# Patient Record
Sex: FEMALE | Race: BLACK OR AFRICAN AMERICAN | Employment: FULL TIME | URBAN - METROPOLITAN AREA
[De-identification: names, ages, dates, MRNs, and addresses within clinical notes are randomized per-mention and may not be internally consistent; named-entity substitution may affect disease eponyms.]

---

## 2017-01-03 DIAGNOSIS — Z76.0 MEDICATION REFILL: ICD-10-CM

## 2017-01-03 RX ORDER — ISOSORBIDE MONONITRATE 60 MG/1
60 TABLET, EXTENDED RELEASE ORAL
Qty: 30 TAB | Refills: 5 | Status: SHIPPED | OUTPATIENT
Start: 2017-01-03

## 2017-01-03 NOTE — TELEPHONE ENCOUNTER
Requested Prescriptions     Pending Prescriptions Disp Refills    isosorbide mononitrate ER (IMDUR) 60 mg CR tablet 30 Tab 5     Sig: Take 1 Tab by mouth every morning.  Indications: ANGINA PECTORIS PREVENTION (DO NOT USE)

## 2017-01-05 DIAGNOSIS — Z76.0 MEDICATION REFILL: ICD-10-CM

## 2017-01-05 RX ORDER — METFORMIN HYDROCHLORIDE 500 MG/1
TABLET ORAL
Qty: 60 TAB | Refills: 5 | Status: SHIPPED | OUTPATIENT
Start: 2017-01-05 | End: 2017-02-15 | Stop reason: SDUPTHER

## 2017-02-14 DIAGNOSIS — Z76.0 MEDICATION REFILL: ICD-10-CM

## 2017-02-14 RX ORDER — AMLODIPINE BESYLATE 10 MG/1
10 TABLET ORAL DAILY
Qty: 90 TAB | Refills: 5 | Status: SHIPPED | OUTPATIENT
Start: 2017-02-14 | End: 2017-03-20 | Stop reason: SDUPTHER

## 2017-02-14 RX ORDER — ROSUVASTATIN CALCIUM 20 MG/1
20 TABLET, COATED ORAL
Qty: 90 TAB | Refills: 5 | Status: SHIPPED | OUTPATIENT
Start: 2017-02-14

## 2017-02-15 ENCOUNTER — TELEPHONE (OUTPATIENT)
Dept: INTERNAL MEDICINE CLINIC | Age: 82
End: 2017-02-15

## 2017-02-15 DIAGNOSIS — Z76.0 MEDICATION REFILL: ICD-10-CM

## 2017-02-15 RX ORDER — GLIPIZIDE 5 MG/1
5 TABLET ORAL 2 TIMES DAILY
Qty: 60 TAB | Refills: 5 | Status: SHIPPED | OUTPATIENT
Start: 2017-02-15 | End: 2017-05-04 | Stop reason: SDUPTHER

## 2017-02-15 RX ORDER — METFORMIN HYDROCHLORIDE 500 MG/1
TABLET ORAL
Qty: 90 TAB | Refills: 5 | Status: SHIPPED | OUTPATIENT
Start: 2017-02-15

## 2017-02-15 NOTE — TELEPHONE ENCOUNTER
Spoke with Ministerio Anders NP, confirmed pt name and . Informed that NP was making annual house visit, patient's  and is asymptomatic. Informed that patient is currently only taking Metformin 500 BID. I advised that the patient was supposed to be taking Glucotrol twice a day, the NP and the pt denied. Stated that the hospital had the patient DC Glucotrol 2016. I advised that Dr. Yovanny Cisneros had restarted at the end of last year. NP stated that the patient has not been taking Glucotrol. After speaking with Dr. Yovanny Cisneros, I advised the NP that Dr. Yovanny Cisneros would be sending over a new Rx for Glucotrol as well as metformin to the patient's local pharmacy. It needed to be picked up and started tonight. The patient would be taking Glucotrol 10 mg BID and Metformin 500 mg 2 tab in the AM and 1 in the afternoon. Ciara and the patient verbalized understanding.  I also spoke with the patient's daughter and scheduled an appointment for the patient for tomorrow @ 130 per Dr. Yovanny Cisneros.     Be advised

## 2017-02-15 NOTE — TELEPHONE ENCOUNTER
I can not locate the other note written earlier by me. It indicated changes in meds due to the high blood sugar. Apparently in the hospital they stopped her glyburide last fall but I restarted in December. However, she has none at home. As she has no sxs per [address], will inc AM metformin to 1000 mg and keep afternoon at 500 mg. Add back glipizide but at lower dose of 5 mg BID.  F/U to be scheduled here

## 2017-02-16 ENCOUNTER — HOSPITAL ENCOUNTER (OUTPATIENT)
Dept: LAB | Age: 82
Discharge: HOME | End: 2017-02-16
Payer: MEDICARE

## 2017-02-16 ENCOUNTER — OFFICE VISIT (OUTPATIENT)
Dept: INTERNAL MEDICINE CLINIC | Age: 82
End: 2017-02-16

## 2017-02-16 VITALS
WEIGHT: 140.2 LBS | OXYGEN SATURATION: 97 % | RESPIRATION RATE: 16 BRPM | BODY MASS INDEX: 22 KG/M2 | TEMPERATURE: 97.8 F | HEART RATE: 73 BPM | DIASTOLIC BLOOD PRESSURE: 63 MMHG | SYSTOLIC BLOOD PRESSURE: 145 MMHG | HEIGHT: 67 IN

## 2017-02-16 DIAGNOSIS — E11.9 CONTROLLED TYPE 2 DIABETES MELLITUS WITHOUT COMPLICATION, WITHOUT LONG-TERM CURRENT USE OF INSULIN (HCC): Primary | Chronic | ICD-10-CM

## 2017-02-16 DIAGNOSIS — Z79.899 MEDICATION MANAGEMENT: ICD-10-CM

## 2017-02-16 DIAGNOSIS — E11.9 CONTROLLED TYPE 2 DIABETES MELLITUS WITHOUT COMPLICATION, WITHOUT LONG-TERM CURRENT USE OF INSULIN (HCC): Chronic | ICD-10-CM

## 2017-02-16 LAB
ALBUMIN SERPL BCP-MCNC: 4 G/DL (ref 3.4–5)
ALBUMIN/GLOB SERPL: 1.1 {RATIO} (ref 0.8–1.7)
ALP SERPL-CCNC: 111 U/L (ref 45–117)
ALT SERPL-CCNC: 25 U/L (ref 13–56)
ANION GAP BLD CALC-SCNC: 15 MMOL/L (ref 3–18)
AST SERPL W P-5'-P-CCNC: 18 U/L (ref 15–37)
BILIRUB SERPL-MCNC: 0.3 MG/DL (ref 0.2–1)
BUN SERPL-MCNC: 17 MG/DL (ref 7–18)
BUN/CREAT SERPL: 14 (ref 12–20)
CALCIUM SERPL-MCNC: 9.4 MG/DL (ref 8.5–10.1)
CHLORIDE SERPL-SCNC: 99 MMOL/L (ref 100–108)
CHOLEST SERPL-MCNC: 221 MG/DL
CO2 SERPL-SCNC: 26 MMOL/L (ref 21–32)
CREAT SERPL-MCNC: 1.25 MG/DL (ref 0.6–1.3)
EST. AVERAGE GLUCOSE BLD GHB EST-MCNC: 344 MG/DL
GLOBULIN SER CALC-MCNC: 3.5 G/DL (ref 2–4)
GLUCOSE SERPL-MCNC: 348 MG/DL (ref 74–99)
HBA1C MFR BLD: 13.6 % (ref 4.2–5.6)
HDLC SERPL-MCNC: 68 MG/DL (ref 40–60)
HDLC SERPL: 3.3 {RATIO} (ref 0–5)
LDLC SERPL CALC-MCNC: 114 MG/DL (ref 0–100)
LIPID PROFILE,FLP: ABNORMAL
POTASSIUM SERPL-SCNC: 4.4 MMOL/L (ref 3.5–5.5)
PROT SERPL-MCNC: 7.5 G/DL (ref 6.4–8.2)
SODIUM SERPL-SCNC: 140 MMOL/L (ref 136–145)
TRIGL SERPL-MCNC: 195 MG/DL (ref ?–150)
VLDLC SERPL CALC-MCNC: 39 MG/DL

## 2017-02-16 PROCEDURE — 80053 COMPREHEN METABOLIC PANEL: CPT | Performed by: INTERNAL MEDICINE

## 2017-02-16 PROCEDURE — 83036 HEMOGLOBIN GLYCOSYLATED A1C: CPT | Performed by: INTERNAL MEDICINE

## 2017-02-16 PROCEDURE — 82043 UR ALBUMIN QUANTITATIVE: CPT | Performed by: INTERNAL MEDICINE

## 2017-02-16 PROCEDURE — 36415 COLL VENOUS BLD VENIPUNCTURE: CPT | Performed by: INTERNAL MEDICINE

## 2017-02-16 PROCEDURE — 80061 LIPID PANEL: CPT | Performed by: INTERNAL MEDICINE

## 2017-02-16 NOTE — MR AVS SNAPSHOT
Visit Information Date & Time Provider Department Dept. Phone Encounter #  
 2/16/2017  1:30 PM Александр Lacy, [address] [id number] Follow-up Instructions Return in about 3 weeks (around 3/9/2017) for diabetes mellitus, htn. Your Appointments 3/24/2017 11:00 AM  
Follow Up with MD Isabel Ken [phone] [facility]) Appt Note: Pt's daughter requested apt due to Alzhiemer's getting worse per daughter. [address] [place] [id number] [phone]  
  
   
 [facility] [id number] Upcoming Health Maintenance Date Due ZOSTER VACCINE AGE 60> 10/14/1991 FOOT EXAM Q1 11/7/2014 HEMOGLOBIN A1C Q6M 6/30/2016 INFLUENZA AGE 9 TO ADULT 8/1/2016 Pneumococcal 65+ Low/Medium Risk (2 of 2 - PPSV23) 8/31/2016 MEDICARE YEARLY EXAM 8/31/2016 LIPID PANEL Q1 8/31/2016 MICROALBUMIN Q1 12/30/2016 DTaP/Tdap/Td series (1 - Tdap) 6/16/2017* EYE EXAM RETINAL OR DILATED Q1 5/4/2017 GLAUCOMA SCREENING Q2Y 5/4/2018 *Topic was postponed. The date shown is not the original due date. Allergies as of 2/16/2017  Review Complete On: 2/16/2017 By: Александр Lacy MD  
  
 Severity Noted Reaction Type Reactions Augmentin [Amoxicillin-pot Clavulanate]  04/25/2013   Side Effect Swelling Lisinopril  04/25/2013   Systemic Angioedema Reglan [Metoclopramide]  11/28/2015    Other (comments) Made her stomach hurt worse Current Immunizations  Reviewed on 11/7/2013 Name Date Influenza High Dose Vaccine PF 12/30/2015  1:09 PM  
 Influenza Vaccine 12/3/2014 Influenza Vaccine PF 11/7/2013 11:31 AM  
 Pneumococcal Conjugate (PCV-13) 8/31/2015 Not reviewed this visit You Were Diagnosed With   
  
 Codes Comments  Controlled type 2 diabetes mellitus without complication, without long-term current use of insulin ([facility]    -  Primary ICD-10-CM: E11.9 ICD-9-CM: 250.00 Medication management     ICD-10-CM: Z79.899 ICD-9-CM: V58.69 Vitals BP Pulse Temp Resp Height(growth percentile) Weight(growth percentile) 145/63 73 97.8 °F (36.6 °C) (Oral) 16 5' 7\" (1.702 m) 140 lb 3.2 oz (63.6 kg) SpO2 BMI OB Status Smoking Status 97% 21.96 kg/m2 Postmenopausal Never Smoker BMI and BSA Data Body Mass Index Body Surface Area  
 21.96 kg/m 2 1.73 m 2 Preferred Pharmacy Pharmacy Name Phone [facility] [address] [phone] Your Updated Medication List  
  
   
This list is accurate as of: 2/16/17  1:50 PM.  Always use your most recent med list. amLODIPine 10 mg tablet Commonly known as:  Kaia Croon Take 1 Tab by mouth daily. Indications: hypertension  
  
 aspirin 81 mg chewable tablet Take 162 mg by mouth daily. clopidogrel 75 mg Tab Commonly known as:  PLAVIX Take 75 mg by mouth daily. ergocalciferol 50,000 unit capsule Commonly known as:  VITAMIN D2 Take 1 Cap by mouth every seven (7) days. glipiZIDE 5 mg tablet Commonly known as:  Annika Cee Take 1 Tab by mouth two (2) times a day. Indications: type 2 diabetes mellitus  
  
 glucose blood VI test strips strip Commonly known as:  ACCU-CHEK RADHA PLUS TEST STRP Use to test blood sugar daily  
  
 isosorbide mononitrate ER 60 mg CR tablet Commonly known as:  IMDUR Take 1 Tab by mouth every morning. Indications: ANGINA PECTORIS PREVENTION (DO NOT USE) Lancets Misc Commonly known as:  ACCU-CHEK SOFTCLIX LANCETS Use to check blood sugar at least once daily  
  
 metFORMIN 500 mg tablet Commonly known as:  GLUCOPHAGE Take 2 tablets in the morning with breakfast and one in the evening with supper  Indications: type 2 diabetes mellitus  
  
 omeprazole 40 mg capsule Commonly known as:  PRILOSEC Take 1 Cap by mouth two (2) times a day. Indications: GASTRIC ULCER  
  
 propylthiouracil 50 mg tablet Commonly known as:  PTU Take 1 Tab by mouth three (3) times daily. rivastigmine 4.6 mg/24 hr patch Commonly known as:  EXELON  
1 Patch by TransDERmal route daily. rosuvastatin 20 mg tablet Commonly known as:  CRESTOR Take 1 Tab by mouth nightly. Indications: hypercholesterolemia  
  
 valsartan-hydroCHLOROthiazide 320-25 mg per tablet Commonly known as:  DIOVAN HCT Take 1 Tab by mouth daily. Follow-up Instructions Return in about 3 weeks (around 3/9/2017) for diabetes mellitus, htn. To-Do List   
 02/16/2017 Lab:  HEMOGLOBIN A1C WITH EAG   
  
 02/16/2017 Lab:  LIPID PANEL   
  
 02/16/2017 Lab:  METABOLIC PANEL, COMPREHENSIVE   
  
 02/16/2017 Lab:  MICROALBUMIN, UR, RAND W/ MICROALBUMIN/CREA RATIO Introducing [facility]! Kiesha Bishop introduces [employer] patient portal. Now you can access parts of your medical record, email your doctor's office, and request medication refills online. 1. In your internet browser, go to [email or web address]. [employer]/Next 2 [place]st 2. Click on the First Time User? Click Here link in the Sign In box. You will see the New Member Sign Up page. 3. Enter your [employer] Access Code exactly as it appears below. You will not need to use this code after youve completed the sign-up process. If you do not sign up before the expiration date, you must request a new code. · [employer] Access Code: [email or web address] Expires: 5/17/2017  1:50 PM 
 
4. Enter the last four digits of your Social Security Number (xxxx) and Date of Birth (mm/dd/yyyy) as indicated and click Submit. You will be taken to the next sign-up page. 5. Create a [employer] ID. This will be your [employer] login ID and cannot be changed, so think of one that is secure and easy to remember. 6. Create a [employer] password. You can change your password at any time. 7. Enter your Password Reset Question and Answer. This can be used at a later time if you forget your password. 8. Enter your e-mail address. You will receive e-mail notification when new information is available in [address] 9. Click Sign Up. You can now view and download portions of your medical record. 10. Click the Download Summary menu link to download a portable copy of your medical information. If you have questions, please visit the Frequently Asked Questions section of the [employer] website. Remember, [employer] is NOT to be used for urgent needs. For medical emergencies, dial 911. Now available from your iPhone and Android! Please provide this summary of care documentation to your next provider. Your primary care clinician is listed as [facility]. If you have any questions after today's visit, please call [phone].

## 2017-02-16 NOTE — PROGRESS NOTES
HISTORY OF PRESENT ILLNESS  Celeste Enamorado is a 80 y.o. female. Visit Vitals    /63    Pulse 73    Temp 97.8 °F (36.6 °C) (Oral)    Resp 16    Ht 5' 7\" (1.702 m)    Wt 140 lb 3.2 oz (63.6 kg)    SpO2 97%    BMI 21.96 kg/m2       HPI Comments: Yesterday had high blood sugar. She denies polyuria, polydipsia, vision changes. But has had some mild nausea. Has dry mouth. This AM felt a little more fatigued    Since she stopped working she has not been watching her diet. She craves sweets now. Weight up 7#    We restarted her glipizide back but at a lower dose    High Blood Sugar   The history is provided by the patient (see comments). This is a chronic problem. The current episode started more than 1 week ago. The problem occurs daily. Associated symptoms include shortness of breath (no significant change). Review of Systems   Constitutional: Negative. Respiratory: Positive for shortness of breath (no significant change). Cardiovascular: Negative. Genitourinary: Negative for frequency and urgency. Endo/Heme/Allergies: Negative for polydipsia. Physical Exam   Constitutional: She is oriented to person, place, and time. She appears well-developed and well-nourished. No distress. Neck: No JVD present. Cardiovascular: Normal rate and regular rhythm. Pulmonary/Chest: Effort normal and breath sounds normal.   Neurological: She is alert and oriented to person, place, and time. Skin: Skin is warm and dry. She is not diaphoretic. Nursing note and vitals reviewed. ASSESSMENT and PLAN    ICD-10-CM ICD-9-CM    1.  Controlled type 2 diabetes mellitus without complication, without long-term current use of insulin ([facility]) S96.9 384.20 METABOLIC PANEL, COMPREHENSIVE      HEMOGLOBIN A1C WITH EAG      MICROALBUMIN, UR, RAND W/ MICROALBUMIN/CREA RATIO      LIPID PANEL   2. Medication management Z79.899 V58.69      15 minute visit, > 50% of time discussing blood sugar control inc diet, exercise, monitoing more closely  Family will  additional meds today    Update lab today.  Will do home checks daily    Cautioned re sweets--need to cut down on sugars    F/u 2-3  Weeks for recheck

## 2017-02-16 NOTE — PROGRESS NOTES
ROOM # 3    Pt presents today for Dm issues, BS was 569 yesterday evening    Pt preferred language for health care discussion is english. Is someone accompanying this pt? daughter    Is the patient using any DME equipment during OV? no    Depression Screening completed. Yes    Learning Assessment completed. Yes    Abuse Screening completed. Yes    Health Maintenance reviewed and discussed per provider. Yes, all HM discussed with the provider    Advance Directive:  1. Do you have an advance directive in place? Patient Reply: No    2. If not, would you like material regarding how to put one in place? Patient Reply: No    Coordination of Care:  1. Have you been to the ER, urgent care clinic since your last visit? Hospitalized since your last visit? No    2. Have you seen or consulted any other health care providers outside of the [address] [place] [address] since your last visit? Include any pap smears or colon screening.  No

## 2017-02-17 LAB
CREAT UR-MCNC: 174.98 MG/DL (ref 30–125)
MICROALBUMIN UR-MCNC: 7.3 MG/DL (ref 0–3)
MICROALBUMIN/CREAT UR-RTO: 42 MG/G (ref 0–30)

## 2017-02-18 ENCOUNTER — TELEPHONE (OUTPATIENT)
Dept: INTERNAL MEDICINE CLINIC | Age: 82
End: 2017-02-18

## 2017-02-18 NOTE — PROGRESS NOTES
Please advise her that her BS was very high, but the change in medication should help.  We'll f/u as planned

## 2017-02-18 NOTE — TELEPHONE ENCOUNTER
----- Message from Rachel Sahu MD sent at 2/18/2017  9:35 AM EST -----  Please advise her that her BS was very high, but the change in medication should help.  We'll f/u as planned

## 2017-02-18 NOTE — TELEPHONE ENCOUNTER
Spoke with patient, confirmed name and . Advised patient of lab results and f/u plan, per Dr. Alona Mcrae. Patient verbalized understanding.      Be advised

## 2017-03-09 ENCOUNTER — OFFICE VISIT (OUTPATIENT)
Dept: INTERNAL MEDICINE CLINIC | Age: 82
End: 2017-03-09

## 2017-03-09 VITALS
SYSTOLIC BLOOD PRESSURE: 134 MMHG | OXYGEN SATURATION: 96 % | HEIGHT: 67 IN | DIASTOLIC BLOOD PRESSURE: 64 MMHG | TEMPERATURE: 96.8 F | HEART RATE: 75 BPM | RESPIRATION RATE: 16 BRPM | BODY MASS INDEX: 21.97 KG/M2 | WEIGHT: 140 LBS

## 2017-03-09 DIAGNOSIS — Z13.39 SCREENING FOR ALCOHOLISM: ICD-10-CM

## 2017-03-09 DIAGNOSIS — I10 ESSENTIAL HYPERTENSION: Chronic | ICD-10-CM

## 2017-03-09 DIAGNOSIS — Z23 ENCOUNTER FOR IMMUNIZATION: ICD-10-CM

## 2017-03-09 DIAGNOSIS — Z00.00 ROUTINE GENERAL MEDICAL EXAMINATION AT A HEALTH CARE FACILITY: Primary | ICD-10-CM

## 2017-03-09 DIAGNOSIS — E11.9 CONTROLLED TYPE 2 DIABETES MELLITUS WITHOUT COMPLICATION, WITHOUT LONG-TERM CURRENT USE OF INSULIN (HCC): Chronic | ICD-10-CM

## 2017-03-09 LAB — GLUCOSE POC: 253 MG/DL

## 2017-03-09 NOTE — PROGRESS NOTES
HISTORY OF PRESENT ILLNESS  Mellisa Sheikh is a 80 y.o. female. Visit Vitals    /64    Pulse 75    Temp 96.8 °F (36 °C) (Oral)    Resp 16    Ht 5' 7\" (1.702 m)    Wt 140 lb (63.5 kg)    SpO2 96%    BMI 21.93 kg/m2       Diabetes   The history is provided by the patient. This is a chronic problem. The current episode started more than 1 week ago. The problem occurs daily. The problem has not changed since onset. Exacerbated by: diet. The symptoms are relieved by medications (diet). Treatments tried: see med list. The treatment provided moderate relief. Hypertension    The history is provided by the patient. This is a chronic problem. The current episode started more than 1 week ago. The problem has not changed since onset. There are no associated agents to hypertension. Risk factors include postmenopause, stress and hypertension. Review of Systems   Constitutional: Negative. Respiratory: Negative. Cardiovascular: Negative. Physical Exam   Constitutional: She is oriented to person, place, and time. She appears well-developed and well-nourished. No distress. Cardiovascular: Normal rate and regular rhythm. Pulmonary/Chest: Effort normal and breath sounds normal.   Musculoskeletal: She exhibits no edema. Neurological: She is alert and oriented to person, place, and time. Skin: Skin is warm and dry. She is not diaphoretic. Psychiatric: She has a normal mood and affect. Nursing note and vitals reviewed. ASSESSMENT and PLAN    ICD-10-CM ICD-9-CM                                Controlled type 2 diabetes mellitus without complication, without long-term current use of insulin ([facility]) E11.9 250.00 AMB POC GLUCOSE BLOOD, BY GLUCOSE MONITORING DEVICE    Essential hypertension I10 401.9        BP controlled    --much better at home, but is much better than last time. Will continue current meds for now    3/3 objects remembered.     F/u 6-8 weeks with additional lab

## 2017-03-09 NOTE — PROGRESS NOTES
Chief Complaint   Patient presents with    Annual Wellness Visit    Diabetes    Hypertension       Pt preferred language for health care discussion is english. Is someone accompanying this pt? daughter    Is the patient using any DME equipment during OV? no    Depression Screening completed. Yes    Learning Assessment completed. Yes    Abuse Screening completed. Yes    Health Maintenance reviewed and discussed per provider. Yes   Foot exam, Zostavax, Pneumo-13 or Peumo-23, Flu. Please order/place referral if appropriate. Advance Directive:  1. Do you have an advance directive in place? Patient Reply:no    2. If not, would you like material regarding how to put one in place? Patient Reply: No    Coordination of Care:  1. Have you been to the ER, urgent care clinic since your last visit? Hospitalized since your last visit? no    2. Have you seen or consulted any other health care providers outside of the [address] [place] [address] since your last visit? Include any pap smears or colon screening.  no

## 2017-03-09 NOTE — PROGRESS NOTES
This is a Subsequent Medicare Annual Wellness Visit providing Personalized Prevention Plan Services (PPPS) (Performed 12 months after initial AWV and PPPS )    I have reviewed the patient's medical history in detail and updated the computerized patient record. History     Past Medical History:   Diagnosis Date    CAD (coronary artery disease)     CH[facility]congestive heart failure) ([facility])     Diabetes ([facility]     Dyslipidemia     Gastric erosions 5/31/16    endoclips used    Hypertension     Hyperthyroidism     Memory change     MI (myocardial infarction) ([facility]       Past Surgical History:   Procedure Laterality Date    CARDIAC SURG PROCEDURE UNLIST  6 stents placed    HX CHOLECYSTECTOMY      stones    HX CORONARY STENT PLACEMENT  2003     Current Outpatient Prescriptions   Medication Sig Dispense Refill    metFORMIN (GLUCOPHAGE) 500 mg tablet Take 2 tablets in the morning with breakfast and one in the evening with supper  Indications: type 2 diabetes mellitus 90 Tab 5    glipiZIDE (GLUCOTROL) 5 mg tablet Take 1 Tab by mouth two (2) times a day. Indications: type 2 diabetes mellitus 60 Tab 5    rosuvastatin (CRESTOR) 20 mg tablet Take 1 Tab by mouth nightly. Indications: hypercholesterolemia 90 Tab 5    amLODIPine (NORVASC) 10 mg tablet Take 1 Tab by mouth daily. Indications: hypertension 90 Tab 5    isosorbide mononitrate ER (IMDUR) 60 mg CR tablet Take 1 Tab by mouth every morning. Indications: ANGINA PECTORIS PREVENTION (DO NOT USE) 30 Tab 5    ergocalciferol (VITAMIN D2) 50,000 unit capsule Take 1 Cap by mouth every seven (7) days. 90 Cap 2    rivastigmine (EXELON) 4.6 mg/24 hr patch 1 Patch by TransDERmal route daily. 30 Patch 1    propylthiouracil (PTU) 50 mg tablet Take 1 Tab by mouth three (3) times daily. 90 Tab 5    valsartan-hydrochlorothiazide (DIOVAN HCT) 320-25 mg per tablet Take 1 Tab by mouth daily.  90 Tab 5    omeprazole (PRILOSEC) 40 mg capsule Take 1 Cap by mouth two (2) times a day. Indications: GASTRIC ULCER 60 Cap 5    Lancets (ACCU-CHEK SOFTCLIX LANCETS) misc Use to check blood sugar at least once daily 1 Package 11    glucose blood VI test strips (ACCU-CHEK RADHA PLUS TEST STRP) strip Use to test blood sugar daily 90 Strip 5    aspirin 81 mg chewable tablet Take 162 mg by mouth daily.  clopidogrel (PLAVIX) 75 mg tablet Take 75 mg by mouth daily. Allergies   Allergen Reactions    Augmentin [Amoxicillin-Pot Clavulanate] Swelling    Lisinopril Angioedema    Reglan [Metoclopramide] Other (comments)     Made her stomach hurt worse     Family History   Problem Relation Age of Onset    Rashes/Skin Problems Sister     Heart Disease Brother     Glaucoma Sister     Other Sister      internal bleeding    Kidney Disease Brother     Hypertension Brother      Social History   Substance Use Topics    Smoking status: Never Smoker    Smokeless tobacco: Never Used    Alcohol use No     Patient Active Problem List   Diagnosis Code    Diabetes mellitus type 2, controlled ([facility]) E11.9    ASCVD (arteriosclerotic cardiovascular disease) I25.10    Essential hypertension I10    Hyperthyroidism E05.90    Advance directive discussed with patient Z70.80    Memory loss R41.3       Depression Risk Factor Screening:     PHQ 2 / 9, over the last two weeks 3/9/2017   Little interest or pleasure in doing things Not at all   Feeling down, depressed or hopeless Not at all   Total Score PHQ 2 0     Alcohol Risk Factor Screening: On any occasion during the past 3 months, have you had more than 3 drinks containing alcohol? No    Do you average more than 7 drinks per week? No      Functional Ability and Level of Safety:     Hearing Loss   none    Activities of Daily Living   Self-care. Requires assistance with: no ADLs    Fall Risk     Fall Risk Assessment, last 12 mths 3/9/2017   Able to walk? Yes   Fall in past 12 months?  No     Abuse Screen   Patient is not abused    Review of Systems   A comprehensive review of systems was negative except for that written in the HPI. Physical Examination     Evaluation of Cognitive Function:  Mood/affect:  happy  Appearance: age appropriate  Family member/caregiver input: self and friend    Visit Vitals    /64    Pulse 75    Temp 96.8 °F (36 °C) (Oral)    Resp 16    Ht 5' 7\" (1.702 m)    Wt 140 lb (63.5 kg)    SpO2 96%    BMI 21.93 kg/m2     General appearance: alert, cooperative, no distress, appears stated age  Ears: normal TM's and external ear canals AU  Throat: Lips, mucosa, and tongue normal. Teeth and gums normal  Lungs: clear to auscultation bilaterally  Heart: regular rate and rhythm, S1, S2 normal, no murmur, click, rub or gallop  Extremities: extremities normal, atraumatic, no cyanosis or edema  0/3 objects remembered  2nd set she remembered 3/3. Patient Care Team:  Kiana Zuleta MD as PCP - General (Internal Medicine)  Raquel Aguilar MD as Referring Provider (Internal Medicine)  Oh Reyes MD as Consulting Provider (Neurology)    Advice/Referrals/Counseling   Education and counseling provided:  Are appropriate based on today's review and evaluation      Assessment/Plan       ICD-10-CM ICD-9-CM    1. Routine general medical examination at a health care facility Z00.00 V70.0    2. Screening for alcoholism Z13.89 V79.1    3.  Encounter for immunization Z23 V03.89 PNEUMOCOCCAL POLYSACCHARIDE VACCINE, 23-VALENT, ADULT OR IMMUNOSUPPRESSED PT DOSE,      ADMIN PNEUMOCOCCAL VACCINE

## 2017-03-09 NOTE — MR AVS SNAPSHOT
Visit Information Date & Time Provider Department Dept. Phone Encounter #  
 3/9/2017  1:45 PM Carolynn Leonardoodessa Vobi [phone] [id number] Follow-up Instructions Return in about 8 weeks (around 5/4/2017) for HTN, DM, cholesterol. Your Appointments 3/24/2017 11:00 AM  
Follow Up with Hermelinda Nguyen MD  
[address] [place] [address]-[facility] Appt Note: Pt's daughter requested apt due to Alzhiemer's getting worse per daughter. [address] [facility] [id number] [phone]  
  
   
 [facility] [address] Upcoming Health Maintenance Date Due  
 FOOT EXAM Q1 11/7/2014 Pneumococcal 65+ Low/Medium Risk (2 of 2 - PPSV23) 8/31/2016 MEDICARE YEARLY EXAM 8/31/2016 DTaP/Tdap/Td series (1 - Tdap) 6/16/2017* ZOSTER VACCINE AGE 60> 3/9/2018* EYE EXAM RETINAL OR DILATED Q1 5/4/2017 HEMOGLOBIN A1C Q6M 8/16/2017 MICROALBUMIN Q1 2/16/2018 LIPID PANEL Q1 2/16/2018 GLAUCOMA SCREENING Q2Y 5/4/2018 *Topic was postponed. The date shown is not the original due date. Allergies as of 3/9/2017  Review Complete On: 3/9/2017 By: Carolynn Mendez MD  
  
 Severity Noted Reaction Type Reactions Augmentin [Amoxicillin-pot Clavulanate]  04/25/2013   Side Effect Swelling Lisinopril  04/25/2013   Systemic Angioedema Reglan [Metoclopramide]  11/28/2015    Other (comments) Made her stomach hurt worse Current Immunizations  Reviewed on 11/7/2013 Name Date Influenza High Dose Vaccine PF 12/30/2015  1:09 PM  
 Influenza Vaccine 12/3/2014 Influenza Vaccine PF 11/7/2013 11:31 AM  
 Pneumococcal Conjugate (PCV-13) 8/31/2015 Pneumococcal Polysaccharide (PPSV-23)  Incomplete Not reviewed this visit You Were Diagnosed With   
  
 Codes Comments  Routine general medical examination at a health care facility    - Primary ICD-10-CM: Z00.00 ICD-9-CM: V70.0 Screening for alcoholism     ICD-10-CM: Z13.89 ICD-9-CM: V79.1 Controlled type 2 diabetes mellitus without complication, without long-term current use of insulin ([facility]     ICD-10-CM: E11.9 ICD-9-CM: 250.00 Essential hypertension     ICD-10-CM: I10 
ICD-9-CM: 401.9 Encounter for immunization     ICD-10-CM: P53 ICD-9-CM: V03.89 Vitals BP Pulse Temp Resp Height(growth percentile) Weight(growth percentile) 134/64 75 96.8 °F (36 °C) (Oral) 16 5' 7\" (1.702 m) 140 lb (63.5 kg) SpO2 BMI OB Status Smoking Status 96% 21.93 kg/m2 Postmenopausal Never Smoker Vitals History BMI and BSA Data Body Mass Index Body Surface Area  
 21.93 kg/m 2 1.73 m 2 Preferred Pharmacy Pharmacy [facility] Phone [facility] [address] [phone] Your Updated Medication List  
  
   
This list is accurate as of: 3/9/17  2:34 PM.  Always use your most recent med list. amLODIPine 10 mg tablet Commonly known as:  Ara Pace Take 1 Tab by mouth daily. Indications: hypertension  
  
 aspirin 81 mg chewable tablet Take 162 mg by mouth daily. clopidogrel 75 mg Tab Commonly known as:  PLAVIX Take 75 mg by mouth daily. ergocalciferol 50,000 unit capsule Commonly known as:  VITAMIN D2 Take 1 Cap by mouth every seven (7) days. glipiZIDE 5 mg tablet Commonly known as:  Hollie Powell Take 1 Tab by mouth two (2) times a day. Indications: type 2 diabetes mellitus  
  
 glucose blood VI test strips strip Commonly known as:  ACCU-CHEK RADHA PLUS TEST STRP Use to test blood sugar daily  
  
 isosorbide mononitrate ER 60 mg CR tablet Commonly known as:  IMDUR Take 1 Tab by mouth every morning. Indications: ANGINA PECTORIS PREVENTION (DO NOT USE) Lancets Misc Commonly known as:  ACCU-CHEK SOFTCLIX LANCETS  
 Use to check blood sugar at least once daily  
  
 metFORMIN 500 mg tablet Commonly known as:  GLUCOPHAGE Take 2 tablets in the morning with breakfast and one in the evening with supper  Indications: type 2 diabetes mellitus  
  
 omeprazole 40 mg capsule Commonly known as:  PRILOSEC Take 1 Cap by mouth two (2) times a day. Indications: GASTRIC ULCER  
  
 propylthiouracil 50 mg tablet Commonly known as:  PTU Take 1 Tab by mouth three (3) times daily. rivastigmine 4.6 mg/24 hr patch Commonly known as:  EXELON  
1 Patch by TransDERmal route daily. rosuvastatin 20 mg tablet Commonly known as:  CRESTOR Take 1 Tab by mouth nightly. Indications: hypercholesterolemia  
  
 valsartan-hydroCHLOROthiazide 320-25 mg per tablet Commonly known as:  DIOVAN HCT Take 1 Tab by mouth daily. We Performed the Following ADMIN PNEUMOCOCCAL VACCINE [ [facility]] AMB POC GLUCOSE BLOOD, BY GLUCOSE MONITORING DEVICE [74374 CPT(R)] PNEUMOCOCCAL POLYSACCHARIDE VACCINE, 23-VALENT, ADULT OR IMMUNOSUPPRESSED PT DOSE, [99042 CPT(R)] Follow-up Instructions Return in about 8 weeks (around 5/4/2017) for HTN, DM, cholesterol. Patient Instructions Schedule of Personalized Health Plan (Provide Copy to Patient) The best way to stay healthy is to live a healthy lifestyle. A healthy lifestyle includes regular exercise, eating a well-balanced diet, keeping a healthy weight and not smoking. Regular physical exams and screening tests are another important way to take care of yourself. Preventive exams provided by health care providers can find health problems early when treatment works best and can keep you from getting certain diseases or illnesses. Preventive services include exams, lab tests, screenings, shots, monitoring and information to help you take care of your own health. All people over 65 should have a pneumonia shot.  Pneumonia shots are usually only needed once in a lifetime unless your doctor decides differently. All people over 65 should have a yearly flu shot. People over 65 are at medium to high risk for Hepatitis B. Three shots are needed for complete protection. In addition to your physical exam, some screening tests are recommended: 
 
Bone mass measurement (dexa scan) is recommended every two years Diabetes Mellitus screening is recommended every year. Glaucoma is an eye disease caused by high pressure in the eye. An eye exam is recommended every year. Cardiovascular screening tests that check your cholesterol and other blood fat (lipid) levels are recommended every five years. Colorectal Cancer screening tests help to find pre-cancerous polyps (growths in the colon) so they can be removed before they turn into cancer. Tests ordered for screening depend on your personal and family history risk factors. Screening for Breast Cancer is recommended yearly with a mammogram. 
 
Screening for Cervical Cancer is recommended every two years (annually for certain risk factors, such as previous history of STD or abnormal PAP in past 7 years), with a Pelvic Exam with PAP Here is a list of your current Health Maintenance items with a due date: 
Health Maintenance Topic Date Due  
 FOOT EXAM Q1  11/07/2014  Pneumococcal 65+ Low/Medium Risk (2 of 2 - PPSV23) 08/31/2016  MEDICARE YEARLY EXAM  08/31/2016  
 DTaP/Tdap/Td series (1 - Tdap) 06/16/2017 (Originally 10/14/1952)  ZOSTER VACCINE AGE 60>  03/09/2018 (Originally 10/14/1991)  EYE EXAM RETINAL OR DILATED Q1  05/04/2017  
 HEMOGLOBIN A1C Q6M  08/16/2017  MICROALBUMIN Q1  02/16/2018  LIPID PANEL Q1  02/16/2018  GLAUCOMA SCREENING Q2Y  05/04/2018  
 OSTEOPOROSIS SCREENING (DEXA)  Addressed  INFLUENZA AGE 9 TO ADULT  Addressed Introducing [facility] & HEALTH SERVICES!    
 Sabino Yoo introduces [employer] patient portal. Now you can access parts of your medical record, email your doctor's office, and request medication refills online. 1. In your internet browser, go to [email or web address] 2. Click on the First Time User? Click Here link in the Sign In box. You will see the New Member Sign Up page. 3. Enter your Arrows[employer] Access Code exactly as it appears below. You will not need to use this code after youve completed the sign-up process. If you do not sign up before the expiration date, you must request a new code. · Arrows[employer] Access Code: [email or web address] Expires: 5/17/2017  1:50 PM 
 
4. Enter the last four digits of your Social Security Number (xxxx) and Date of Birth (mm/dd/yyyy) as indicated and click Submit. You will be taken to the next sign-up page. 5. Create a Arrowsight ID. This will be your Arrowsight login ID and cannot be changed, so think of one that is secure and easy to remember. 6. Create a Arrowsight password. You can change your password at any time. 7. Enter your Password Reset Question and Answer. This can be used at a later time if you forget your password. 8. Enter your e-mail address. You will receive e-mail notification when new information is available in [address] 9. Click Sign Up. You can now view and download portions of your medical record. 10. Click the Download Summary menu link to download a portable copy of your medical information. If you have questions, please visit the Frequently Asked Questions section of the [employer] website. Remember, [employer] is NOT to be used for urgent needs. For medical emergencies, dial 911. Now available from your iPhone and Android! Please provide this summary of care documentation to your next provider. Your primary care clinician is listed as [facility]. If you have any questions after today's visit, please call [phone].

## 2017-03-09 NOTE — PATIENT INSTRUCTIONS
Schedule of Personalized Health Plan  (Provide Copy to Patient)  The best way to stay healthy is to live a healthy lifestyle. A healthy lifestyle includes regular exercise, eating a well-balanced diet, keeping a healthy weight and not smoking. Regular physical exams and screening tests are another important way to take care of yourself. Preventive exams provided by health care providers can find health problems early when treatment works best and can keep you from getting certain diseases or illnesses. Preventive services include exams, lab tests, screenings, shots, monitoring and information to help you take care of your own health. All people over 65 should have a pneumonia shot. Pneumonia shots are usually only needed once in a lifetime unless your doctor decides differently. All people over 65 should have a yearly flu shot. People over 65 are at medium to high risk for Hepatitis B. Three shots are needed for complete protection. In addition to your physical exam, some screening tests are recommended:    Bone mass measurement (dexa scan) is recommended every two years  Diabetes Mellitus screening is recommended every year. Glaucoma is an eye disease caused by high pressure in the eye. An eye exam is recommended every year. Cardiovascular screening tests that check your cholesterol and other blood fat (lipid) levels are recommended every five years. Colorectal Cancer screening tests help to find pre-cancerous polyps (growths in the colon) so they can be removed before they turn into cancer. Tests ordered for screening depend on your personal and family history risk factors.     Screening for Breast Cancer is recommended yearly with a mammogram.    Screening for Cervical Cancer is recommended every two years (annually for certain risk factors, such as previous history of STD or abnormal PAP in past 7 years), with a Pelvic Exam with PAP    Here is a list of your current Health Maintenance items with a due date:  Health Maintenance   Topic Date Due    FOOT EXAM Q1  11/07/2014    Pneumococcal 65+ Low/Medium Risk (2 of 2 - PPSV23) 08/31/2016    MEDICARE YEARLY EXAM  08/31/2016    DTaP/Tdap/Td series (1 - Tdap) 06/16/2017 (Originally 10/14/1952)    ZOSTER VACCINE AGE 60>  03/09/2018 (Originally 10/14/1991)    EYE EXAM RETINAL OR DILATED Q1  05/04/2017    HEMOGLOBIN A1C Q6M  08/16/2017    MICROALBUMIN Q1  02/16/2018    LIPID PANEL Q1  02/16/2018    GLAUCOMA SCREENING Q2Y  05/04/2018    OSTEOPOROSIS SCREENING (DEXA)  Addressed    INFLUENZA AGE 9 TO ADULT  Addressed

## 2017-03-20 RX ORDER — AMLODIPINE BESYLATE 10 MG/1
10 TABLET ORAL DAILY
Qty: 90 TAB | Refills: 5 | Status: SHIPPED | OUTPATIENT
Start: 2017-03-20

## 2017-03-20 NOTE — TELEPHONE ENCOUNTER
Requested Prescriptions     Pending Prescriptions Disp Refills    amLODIPine (NORVASC) 10 mg tablet 90 Tab 5     Sig: Take 1 Tab by mouth daily.  Indications: hypertension

## 2017-03-24 ENCOUNTER — OFFICE VISIT (OUTPATIENT)
Dept: NEUROLOGY | Age: 82
End: 2017-03-24

## 2017-03-24 VITALS
TEMPERATURE: 98.2 F | WEIGHT: 146 LBS | OXYGEN SATURATION: 97 % | BODY MASS INDEX: 22.91 KG/M2 | DIASTOLIC BLOOD PRESSURE: 64 MMHG | SYSTOLIC BLOOD PRESSURE: 138 MMHG | HEART RATE: 82 BPM | HEIGHT: 67 IN | RESPIRATION RATE: 14 BRPM

## 2017-03-24 DIAGNOSIS — R41.3 MEMORY LOSS: Primary | ICD-10-CM

## 2017-03-24 DIAGNOSIS — G30.1 LATE ONSET ALZHEIMER'S DISEASE WITHOUT BEHAVIORAL DISTURBANCE (HCC): ICD-10-CM

## 2017-03-24 DIAGNOSIS — F02.80 LATE ONSET ALZHEIMER'S DISEASE WITHOUT BEHAVIORAL DISTURBANCE (HCC): ICD-10-CM

## 2017-03-24 DIAGNOSIS — I67.9 SMALL VESSEL DISEASE, CEREBROVASCULAR: ICD-10-CM

## 2017-03-24 RX ORDER — RIVASTIGMINE 9.5 MG/24H
1 PATCH, EXTENDED RELEASE TRANSDERMAL DAILY
Qty: 30 PATCH | Refills: 1 | Status: SHIPPED | OUTPATIENT
Start: 2017-03-24

## 2017-03-24 NOTE — MR AVS SNAPSHOT
Visit Information Date & Time Provider Department Dept. Phone Encounter #  
 3/24/2017 11:00 AM Mukul Breaux, [address] [phone] [id number] Follow-up Instructions Return in about 1 month (around 4/24/2017). Your Appointments 5/4/2017  8:45 AM  
SAME DAY with Cha Nino MD  
[address] ([address]) Appt Note: Return in about 8 weeks (around 5/4/2017) for HTN, DM, cholesterol. [address]  
  
   
 [address] [facility] 5/12/2017  2:45 PM  
Follow Up with Mukul Breaux MD  
[facility] [address]) Appt Note: 1 month [address] [facility] [address]  
[phone]  
  
   
 [facility] [address] Upcoming Health Maintenance Date Due  
 FOOT EXAM Q1 11/7/2014 DTaP/Tdap/Td series (1 - Tdap) 6/16/2017* ZOSTER VACCINE AGE 60> 3/9/2018* EYE EXAM RETINAL OR DILATED Q1 5/4/2017 HEMOGLOBIN A1C Q6M 8/16/2017 MICROALBUMIN Q1 2/16/2018 LIPID PANEL Q1 2/16/2018 MEDICARE YEARLY EXAM 3/10/2018 GLAUCOMA SCREENING Q2Y 5/4/2018 *Topic was postponed. The date shown is not the original due date. Allergies as of 3/24/2017  Review Complete On: 3/24/2017 By: Mukul Breaux MD  
  
 Severity Noted Reaction Type Reactions Augmentin [Amoxicillin-pot Clavulanate]  04/25/2013   Side Effect Swelling Lisinopril  04/25/2013   Systemic Angioedema Reglan [Metoclopramide]  11/28/2015    Other (comments) Made her stomach hurt worse Current Immunizations  Reviewed on 11/7/2013 Name Date Influenza High Dose Vaccine PF 12/30/2015  1:09 PM  
 Influenza Vaccine 12/3/2014 Influenza Vaccine PF 11/7/2013 11:31 AM  
 Pneumococcal Conjugate (PCV-13) 8/31/2015 Pneumococcal Polysaccharide (PPSV-23) 3/9/2017  3:00 PM  
 Tetanus Toxoid, Adsorbed 10/12/2012 12:00 AM  
  
 Not reviewed this visit You Were Diagnosed With   
  
 Codes Comments Memory loss    -  Primary ICD-10-CM: R41.3 ICD-9-CM: 780.93 Small vessel disease, cerebrovascular     ICD-10-CM: I67.9 ICD-9-CM: 437.9 Late onset Alzheimer's disease without behavioral disturbance     ICD-10-CM: G30.1, F02.80 ICD-9-CM: 331.0, 294.10 Vitals BP Pulse Temp Resp Height(growth percentile) Weight(growth percentile)  
 138/64 (BP 1 Location: Right arm, BP Patient Position: Sitting) 82 98.2 °F (36.8 °C) (Oral) 14 5' 7\" (1.702 m) 146 lb (66.2 kg) SpO2 BMI OB Status Smoking Status 97% 22.87 kg/m2 Postmenopausal Never Smoker Vitals History BMI and BSA Data Body Mass Index Body Surface Area  
 22.87 kg/m 2 1.77 m 2 Preferred Pharmacy Pharmacy Name Phone [facility] [address] [phone] Your Updated Medication List  
  
   
This list is accurate as of: 3/24/17 12:01 PM.  Always use your most recent med list. amLODIPine 10 mg tablet Commonly known as:  Arlyss Benito Take 1 Tab by mouth daily. Indications: hypertension  
  
 aspirin 81 mg chewable tablet Take 162 mg by mouth daily. clopidogrel 75 mg Tab Commonly known as:  PLAVIX Take 75 mg by mouth daily. ergocalciferol 50,000 unit capsule Commonly known as:  VITAMIN D2 Take 1 Cap by mouth every seven (7) days. glipiZIDE 5 mg tablet Commonly known as:  Janith Ket Take 1 Tab by mouth two (2) times a day. Indications: type 2 diabetes mellitus  
  
 glucose blood VI test strips strip Commonly known as:  ACCU-CHEK RADHA PLUS TEST STRP Use to test blood sugar daily  
  
 isosorbide mononitrate ER 60 mg CR tablet Commonly known as:  IMDUR Take 1 Tab by mouth every morning.  Indications: ANGINA PECTORIS PREVENTION (DO NOT USE) Lancets Misc Commonly known as:  ACCU-CHEK SOFTCLIX LANCETS Use to check blood sugar at least once daily  
  
 metFORMIN 500 mg tablet Commonly known as:  GLUCOPHAGE Take 2 tablets in the morning with breakfast and one in the evening with supper  Indications: type 2 diabetes mellitus  
  
 omeprazole 40 mg capsule Commonly known as:  PRILOSEC Take 1 Cap by mouth two (2) times a day. Indications: GASTRIC ULCER  
  
 propylthiouracil 50 mg tablet Commonly known as:  PTU Take 1 Tab by mouth three (3) times daily. rivastigmine 9.5 mg/24 hr patch Commonly known as:  EXELON  
1 Patch by TransDERmal route daily. rosuvastatin 20 mg tablet Commonly known as:  CRESTOR Take 1 Tab by mouth nightly. Indications: hypercholesterolemia  
  
 valsartan-hydroCHLOROthiazide 320-25 mg per tablet Commonly known as:  DIOVAN HCT Take 1 Tab by mouth daily. Prescriptions Sent to Pharmacy Refills  
 rivastigmine (EXELON) 9.5 mg/24 hr patch 1 Si Patch by TransDERmal route daily. Class: Normal  
 Pharmacy: [id number] [place] [address] Ph #: [phone] Route: TransDERmal  
  
Follow-up Instructions Return in about 1 month (around 2017). Introducing [facility] & HEALTH SERVICES! [facility] introduces "[employer] patient portal. Now you can access parts of your medical record, email your doctor's office, and request medication refills online. 1. In your internet browser, go to [email or web address]„¢ [email or web address] 2. Click on the First Time User? Click Here link in the Sign In box. You will see the New Member Sign Up page. 3. Enter your "[employer] Access Code exactly as it appears below. You will not need to use this code after youve completed the sign-up process. If you do not sign up before the expiration date, you must request a new code.  
 
· "[employer] Access Code: [email or web address] 
 Expires: 5/17/2017  2:50 PM 
 
4. Enter the last four digits of your Social Security Number (xxxx) and Date of Birth (mm/dd/yyyy) as indicated and click Submit. You will be taken to the next sign-up page. 5. Create a [employer] ID. This will be your [employer] login ID and cannot be changed, so think of one that is secure and easy to remember. 6. Create a [employer] password. You can change your password at any time. 7. Enter your Password Reset Question and Answer. This can be used at a later time if you forget your password. 8. Enter your e-mail address. You will receive e-mail notification when new information is available in [address] 9. Click Sign Up. You can now view and download portions of your medical record. 10. Click the Download Summary menu link to download a portable copy of your medical information. If you have questions, please visit the Frequently Asked Questions section of the [employer] website. Remember, [employer] is NOT to be used for urgent needs. For medical emergencies, dial 911. Now available from your iPhone and Android! Please provide this summary of care documentation to your next provider. Your primary care clinician is listed as [facility]. If you have any questions after today's visit, please call [phone].

## 2017-03-24 NOTE — PROGRESS NOTES
[facility]             [address] [facility], [address]    3/24/2017    HPI:  Moncho Corral is a 80 y.o., right handed,   female, who presents with memory loss. patient had accompanied by daughter. Daughter provides much of the, history there's been significant decline in memory over the past 3 to four-week period about this time. She was started on Ultram for leg pain. Daughter is noted difficulties with short and long-term memory, difficulty with placement of items his knowledge. The patient she also notes that she has increased difficulty with verbal more than written comprehension. Daughter notes a change in current personality and mild memory difficulties over the past 6-7 months. Her   in October and she did knowledge is being more anxious and depressed since then and possibly not being able to work. She has undergone an MRI examination and recent lab    Patient has been off medication including exelon since running out but daughter unsure of benefit she does note patient's daughter does note increased difficulty with short-term memory the patient not remembering that she is completed the meal even after just completing. There are no new difficulties patient did not have difficulties with the patch  Current Outpatient Prescriptions   Medication Sig Dispense Refill    rivastigmine (EXELON) 9.5 mg/24 hr patch 1 Patch by TransDERmal route daily. 30 Patch 1    amLODIPine (NORVASC) 10 mg tablet Take 1 Tab by mouth daily. Indications: hypertension 90 Tab 5    metFORMIN (GLUCOPHAGE) 500 mg tablet Take 2 tablets in the morning with breakfast and one in the evening with supper  Indications: type 2 diabetes mellitus 90 Tab 5    glipiZIDE (GLUCOTROL) 5 mg tablet Take 1 Tab by mouth two (2) times a day. Indications: type 2 diabetes mellitus 60 Tab 5    rosuvastatin (CRESTOR) 20 mg tablet Take 1 Tab by mouth nightly.  Indications: hypercholesterolemia 90 Tab 5    isosorbide mononitrate ER (IMDUR) 60 mg CR tablet Take 1 Tab by mouth every morning. Indications: ANGINA PECTORIS PREVENTION (DO NOT USE) 30 Tab 5    ergocalciferol (VITAMIN D2) 50,000 unit capsule Take 1 Cap by mouth every seven (7) days. 90 Cap 2    propylthiouracil (PTU) 50 mg tablet Take 1 Tab by mouth three (3) times daily. 90 Tab 5    valsartan-hydrochlorothiazide (DIOVAN HCT) 320-25 mg per tablet Take 1 Tab by mouth daily. 90 Tab 5    omeprazole (PRILOSEC) 40 mg capsule Take 1 Cap by mouth two (2) times a day. Indications: GASTRIC ULCER 60 Cap 5    Lancets (ACCU-CHEK SOFTCLIX LANCETS) misc Use to check blood sugar at least once daily 1 Package 11    glucose blood VI test strips (ACCU-CHEK RADHA PLUS TEST STRP) strip Use to test blood sugar daily 90 Strip 5    aspirin 81 mg chewable tablet Take 162 mg by mouth daily.  clopidogrel (PLAVIX) 75 mg tablet Take 75 mg by mouth daily.          Past Medical History:   Diagnosis Date    CAD (coronary artery disease)     [facility]congest[facility] heart failure) [facility])     Diabetes ([facility]     Dyslipidemia     Gastric erosions 5/31/16    endoclips used    Hypertension     Hyperthyroidism     Memory change     MI (myocardial infarction) ([facility] [facility]        Past Surgical History:   Procedure Laterality Date    CARDIAC SURG PROCEDURE UNLIST  6 stents placed    HX CHOLECYSTECTOMY      stones    HX CORONARY STENT PLACEMENT  2003     Family History   Problem Relation Age of Onset    Rashes/Skin Problems Sister     Heart Disease Brother     Glaucoma Sister     Other Sister      internal bleeding    Kidney Disease Brother     Hypertension Brother      Allergies   Allergen Reactions    Augmentin [Amoxicillin-Pot Clavulanate] Swelling    Lisinopril Angioedema    Reglan [Metoclopramide] Other (comments)     Made her stomach hurt worse       Review of Systems:   Review of Systems - History obtained from mother and child and the patient  General ROS: negative  Psychological ROS: positive for - anxiety, depression and paranoia  ENT ROS: negative  Hematological and Lymphatic ROS: negative  Endocrine ROS: negative  Respiratory ROS: no cough, shortness of breath, or wheezing  Cardiovascular ROS: no chest pain or dyspnea on exertion  Gastrointestinal ROS: no abdominal pain, change in bowel habits, or black or bloody stools  Genito-Urinary ROS: positive for - incontinence  Musculoskeletal ROS: positive for - joint pain and pain in back - lower and leg - left  Neurological ROS: positive for - confusion, impaired coordination/balance, memory loss and weakness  Dermatological ROS: negative    PHYSICAL EXAMINATION:    Visit Vitals    /64 (BP 1 Location: Right arm, BP Patient Position: Sitting)    Pulse 82    Temp 98.2 °F (36.8 °C) (Oral)    Resp 14    Ht 5' 7\" (1.702 m)    Wt 66.2 kg (146 lb)    SpO2 97%    BMI 22.87 kg/m2     General:  Well defined, nourished, and groomed individual in no acute distress. Neck: Supple, nontender, thyroid within normal limits, no JVD, no bruits, no pain with resistance to active range of motion. Heart: Regular rate and rhythm, no murmurs, rub, or gallop. Normal S1S2. Lungs:  Clear to auscultation bilaterally with equal chest expansion, no cough, no wheeze  Musculoskeletal:  Extremities revealed no edema and had full range of motion of joints. Psych:  Good mood and normal affect    NEUROLOGICAL EXAMINATION:     Mental Status:   Alert and oriented to person, place,not time with poor recent and remote memory it. Attention span and concentration are normal. Speech is fluent with a fair fund of knowledge. 2/3 stm    Cranial Nerves:    II, III, IV, VI:  Visual acuity grossly intact. Visual fields are normal.    Pupils are equal, round, and reactive to light and accommodation. Extra-ocular movements are full and fluid. Fundoscopic exam was not well visualized,mild ptosisnonystagmus. V-XII: Hearing is grossly intact. Facial features are symmetric,. The palate rises symmetrically and the tongue protrudes midline. Sternocleidomastoids 5/5. Motor Examination: Normal tone, bulk strength consistent with apraxia and effort No cogwheel rigidity or clonus present. Coordination: No resting or intention tremor    Gait and Station:  Steady while walking No muscle wasting or fasiculations noted. Assessment and Plan:   Naveen Prince is a 80 y.o. right handed female whose history and physical are consistent with dementia . Naveen Prince who has risk factors including diabetes,hypertension, small vessel cerebrovascular disease    So was seen today for follow-up and neurologic problem. Diagnoses and all orders for this visit:    Memory loss    Small vessel disease, cerebrovascular    Late onset Alzheimer's disease without behavioral disturbance    Other orders  -     rivastigmine (EXELON) 9.5 mg/24 hr patch; 1 Patch by TransDERmal route daily. Follow-up Disposition:  Return in about 1 month (around 4/24/2017). Reviewed mri lab and notes in [facility] care and care everywhere  Reviewed risks and benefits of exelon therapy therapy Patient and daughter to discuss home care with pcp  I spent  30  minutes with the patient in face-to-face consultation, of which greater than 50% was spent in counseling and coordination of care as described above.

## 2017-05-04 ENCOUNTER — OFFICE VISIT (OUTPATIENT)
Dept: INTERNAL MEDICINE CLINIC | Age: 82
End: 2017-05-04

## 2017-05-04 VITALS
HEIGHT: 67 IN | WEIGHT: 146 LBS | SYSTOLIC BLOOD PRESSURE: 150 MMHG | DIASTOLIC BLOOD PRESSURE: 80 MMHG | TEMPERATURE: 96.7 F | RESPIRATION RATE: 18 BRPM | BODY MASS INDEX: 22.91 KG/M2 | HEART RATE: 90 BPM | OXYGEN SATURATION: 96 %

## 2017-05-04 DIAGNOSIS — R41.3 MEMORY LOSS: ICD-10-CM

## 2017-05-04 DIAGNOSIS — I10 ESSENTIAL HYPERTENSION: Chronic | ICD-10-CM

## 2017-05-04 DIAGNOSIS — Z76.0 MEDICATION REFILL: ICD-10-CM

## 2017-05-04 DIAGNOSIS — I25.10 ASCVD (ARTERIOSCLEROTIC CARDIOVASCULAR DISEASE): Chronic | ICD-10-CM

## 2017-05-04 DIAGNOSIS — E11.9 CONTROLLED TYPE 2 DIABETES MELLITUS WITHOUT COMPLICATION, WITHOUT LONG-TERM CURRENT USE OF INSULIN (HCC): Primary | Chronic | ICD-10-CM

## 2017-05-04 LAB — GLUCOSE POC: NORMAL MG/DL

## 2017-05-04 RX ORDER — GLIPIZIDE 10 MG/1
10 TABLET ORAL 2 TIMES DAILY
Qty: 60 TAB | Refills: 5 | Status: SHIPPED | OUTPATIENT
Start: 2017-05-04

## 2017-05-04 NOTE — PROGRESS NOTES
This is a Subsequent Medicare Annual Wellness Visit providing Personalized Prevention Plan Services (PPPS) (Performed 12 months after initial AWV and PPPS )    I have reviewed the patient's medical history in detail and updated the computerized patient record. History     Past Medical History:   Diagnosis Date    CAD (coronary artery disease)     CHF (congestive heart failure) (HCC)     Diabetes ([facility] [address]     Dyslipidemia     Gastric erosions 5/31/16    endoclips used    Hypertension     Hyperthyroidism     Memory change     MI (myocardial infarction) ([facility] [address]       Past Surgical History:   Procedure Laterality Date    CARDIAC SURG PROCEDURE UNLIST  6 stents placed    HX CHOLECYSTECTOMY      stones    HX CORONARY STENT PLACEMENT  2003     Current Outpatient Prescriptions   Medication Sig Dispense Refill    rivastigmine (EXELON) 9.5 mg/24 hr patch 1 Patch by TransDERmal route daily. 30 Patch 1    amLODIPine (NORVASC) 10 mg tablet Take 1 Tab by mouth daily. Indications: hypertension 90 Tab 5    metFORMIN (GLUCOPHAGE) 500 mg tablet Take 2 tablets in the morning with breakfast and one in the evening with supper  Indications: type 2 diabetes mellitus 90 Tab 5    glipiZIDE (GLUCOTROL) 5 mg tablet Take 1 Tab by mouth two (2) times a day. Indications: type 2 diabetes mellitus 60 Tab 5    rosuvastatin (CRESTOR) 20 mg tablet Take 1 Tab by mouth nightly. Indications: hypercholesterolemia 90 Tab 5    ergocalciferol (VITAMIN D2) 50,000 unit capsule Take 1 Cap by mouth every seven (7) days. 90 Cap 2    propylthiouracil (PTU) 50 mg tablet Take 1 Tab by mouth three (3) times daily. 90 Tab 5    valsartan-hydrochlorothiazide (DIOVAN HCT) 320-25 mg per tablet Take 1 Tab by mouth daily. 90 Tab 5    omeprazole (PRILOSEC) 40 mg capsule Take 1 Cap by mouth two (2) times a day.  Indications: GASTRIC ULCER 60 Cap 5    Lancets (ACCU-CHEK SOFTCLIX LANCETS) misc Use to check blood sugar at least once daily 1 Package 11    glucose blood VI test strips (ACCU-CHEK RADHA PLUS TEST STRP) strip Use to test blood sugar daily 90 Strip 5    aspirin 81 mg chewable tablet Take 162 mg by mouth daily.  clopidogrel (PLAVIX) 75 mg tablet Take 75 mg by mouth daily.  isosorbide mononitrate ER (IMDUR) 60 mg CR tablet Take 1 Tab by mouth every morning. Indications: ANGINA PECTORIS PREVENTION (DO NOT USE) 30 Tab 5     Allergies   Allergen Reactions    Augmentin [Amoxicillin-Pot Clavulanate] Swelling    Lisinopril Angioedema    Reglan [Metoclopramide] Other (comments)     Made her stomach hurt worse     Family History   Problem Relation Age of Onset    Rashes/Skin Problems Sister     Heart Disease Brother     Glaucoma Sister     Other Sister      internal bleeding    Kidney Disease Brother     Hypertension Brother      Social History   Substance Use Topics    Smoking status: Never Smoker    Smokeless tobacco: Never Used    Alcohol use No     Patient Active Problem List   Diagnosis Code    Diabetes mellitus type 2, controlled ([facility]) E11.9    ASCVD (arteriosclerotic cardiovascular disease) I25.10    Essential hypertension I10    Hyperthyroidism E05.90    Advance directive discussed with patient Z70.80    Memory loss R41.3       Depression Risk Factor Screening:     PHQ over the last two weeks 5/4/2017   Little interest or pleasure in doing things Not at all   Feeling down, depressed or hopeless Not at all   Total Score PHQ 2 0     Alcohol Risk Factor Screening: On any occasion during the past 3 months, have you had more than 3 drinks containing alcohol? No    Do you average more than 7 drinks per week? No      Functional Ability and Level of Safety:     Hearing Loss   none    Activities of Daily Living   Self-care. Requires assistance with: no ADLs    Fall Risk     Fall Risk Assessment, last 12 mths 5/4/2017   Able to walk? Yes   Fall in past 12 months?  No     Abuse Screen   Patient is not abused    Review of Systems   A comprehensive review of systems was negative except for that written in the HPI. 2/3 objects remembered  8 animals named in a minute    Physical Examination     Evaluation of Cognitive Function:  Mood/affect:  happy  Appearance: age appropriate  Family member/caregiver input: self    No exam performed today, not indicated. Patient Care Team:  Myra Lujan MD as PCP - General (Internal Medicine)  Yaya Barone MD as Referring Provider (Internal Medicine)  Blaise Fernandez MD as Consulting Provider (Neurology)    Advice/Referrals/Counseling   Education and counseling provided:  Are appropriate based on today's review and evaluation      Assessment/Plan       ICD-10-CM ICD-9-CM    1. Routine general medical examination at a health care facility Z00.00 V70.0    2. Screening for alcoholism Z13.89 V79.1    .

## 2017-05-04 NOTE — PROGRESS NOTES
HISTORY OF PRESENT ILLNESS  Dexter Garza is a 80 y.o. female. Visit Vitals    /80    Pulse 90    Temp 96.7 °F (35.9 °C) (Oral)    Resp 18    Ht 5' 7\" (1.702 m)    Wt 146 lb (66.2 kg)    SpO2 96%    BMI 22.87 kg/m2       Diabetes   The history is provided by the patient (has not been controlled well). This is a chronic problem. The current episode started more than 1 week ago. The problem occurs daily. Pertinent negatives include no chest pain and no shortness of breath. Exacerbated by: diet. The symptoms are relieved by medications (diet). Treatments tried: see med list. The treatment provided mild relief. Hypertension    The history is provided by the patient. This is a chronic problem. The current episode started more than 1 week ago. The problem has not changed since onset. Pertinent negatives include no chest pain, no palpitations and no shortness of breath. Risk factors include postmenopause and diabetes mellitus. Review of Systems   Constitutional: Negative for chills and fever. HENT:        C/o dry mouth   Respiratory: Negative for shortness of breath. Cardiovascular: Negative for chest pain and palpitations. Neurological: Positive for tingling (in her feet). Endo/Heme/Allergies: Negative for polydipsia. Psychiatric/Behavioral: Positive for memory loss (known Alzheimer's). Physical Exam   Constitutional: She is oriented to person, place, and time. She appears well-developed and well-nourished. No distress. Cardiovascular: Normal rate and regular rhythm. Pulmonary/Chest: Effort normal and breath sounds normal.   Musculoskeletal: She exhibits no edema. Neurological: She is alert and oriented to person, place, and time. Diabetic foot exam:     Left: Reflexes absent     Vibratory sensation     Proprioception normal   Sharp/dull discrimination     Filament test reduced sensation with micro filament   Pulse DP: 1+ (weak)   Pulse PT:    Deformities: Mild - bunion. Pressure point under middle metatarsal.    Right: Reflexes absent   Vibratory sensation    Proprioception normal   Sharp/dull discrimination    Filament test reduced sensation with micro filament   Pulse DP: 1+ (weak)   Pulse PT:    Deformities: Mild - significant bunion deformity. Metatarsal callouses     Skin: Skin is warm and dry. She is not diaphoretic. Psychiatric: She has a normal mood and affect. Nursing note and vitals reviewed. ASSESSMENT and PLAN    ICD-10-CM ICD-9-CM                   Controlled type 2 diabetes mellitus without complication, without long-term current use of insulin ([facility]) E11.9 250.00 AMB POC GLUCOSE BLOOD, BY GLUCOSE MONITORING DEVICE       DIABETES FOOT EXAM    Essential hypertension I10 401.9     ASCVD (arteriosclerotic cardiovascular disease) I25.10 429.2      440.9     Memory loss R41.3 780.93     Medication refill Z76.0 V68.1 glipiZIDE (GLUCOTROL) 10 mg tablet       She is exhibiting worsening short term memory problems. Family indicate this is more and more noticeable at home. 2/3 objects remembered. 8 animals in 60 seconds. Check FS glucose--very high so will double the glipizide. Already on metformin Warned re low blood sugar.  May need to add additional meds next time    BP up some but coming down    Briefly discussed dementia with family    F/u 1 month for BS recheck

## 2017-05-04 NOTE — PROGRESS NOTES
Chief Complaint   Patient presents with    Cholesterol Problem    Diabetes    Hypertension       Pt preferred language for health care discussion is english. Is someone accompanying this pt? daughter    Is the patient using any DME equipment during OV? no    Depression Screening completed. Yes    Learning Assessment completed. Yes    Abuse Screening completed. Yes    Health Maintenance reviewed and discussed per provider. Yes    Pt is due for Foot exam, Diabetic eye exam.  Please order/place referral if appropriate. Advance Directive:  1. Do you have an advance directive in place? Patient Reply:No    2. If not, would you like material regarding how to put one in place? Patient Reply: No    Coordination of Care:  1. Have you been to the ER, urgent care clinic since your last visit? Hospitalized since your last visit? No    2. Have you seen or consulted any other health care providers outside of the [address] [place] [address] since your last visit? Include any pap smears or colon screening.  No

## 2017-05-04 NOTE — PATIENT INSTRUCTIONS
Schedule of Personalized Health Plan  (Provide Copy to Patient)  The best way to stay healthy is to live a healthy lifestyle. A healthy lifestyle includes regular exercise, eating a well-balanced diet, keeping a healthy weight and not smoking. Regular physical exams and screening tests are another important way to take care of yourself. Preventive exams provided by health care providers can find health problems early when treatment works best and can keep you from getting certain diseases or illnesses. Preventive services include exams, lab tests, screenings, shots, monitoring and information to help you take care of your own health. All people over 65 should have a pneumonia shot. Pneumonia shots are usually only needed once in a lifetime unless your doctor decides differently.        Here is a list of your current Health Maintenance items with a due date:  Health Maintenance   Topic Date Due    FOOT EXAM Q1  11/07/2014    DTaP/Tdap/Td series (1 - Tdap) 06/16/2017 (Originally 10/14/1952)    EYE EXAM RETINAL OR DILATED Q1  08/02/2017 (Originally 5/4/2017)    ZOSTER VACCINE AGE 60>  03/09/2018 (Originally 10/14/1991)    INFLUENZA AGE 9 TO ADULT  08/01/2017    HEMOGLOBIN A1C Q6M  08/16/2017    MICROALBUMIN Q1  02/16/2018    LIPID PANEL Q1  02/16/2018    MEDICARE YEARLY EXAM  03/10/2018    GLAUCOMA SCREENING Q2Y  05/04/2018    OSTEOPOROSIS SCREENING (DEXA)  Addressed    Pneumococcal 65+ Low/Medium Risk  Completed

## 2017-05-04 NOTE — MR AVS SNAPSHOT
Visit Information Date & Time Provider Department Dept. Phone Encounter #  
 5/4/2017 11:30 AM Sharmin Paulino, [address] [id number] Follow-up Instructions Return in about 1 month (around 6/4/2017) for HTN, DM, cholesterol. Your Appointments 5/12/2017  2:45 PM  
Follow Up with Maria E Cisse MD  
[address] [place] [address]) Appt Note: 1 month [address] [place] [id number] [phone]  
  
   
 [facility] [id number] Upcoming Health Maintenance Date Due  
 FOOT EXAM Q1 11/7/2014 DTaP/Tdap/Td series (1 - Tdap) 6/16/2017* EYE EXAM RETINAL OR DILATED Q1 8/2/2017* ZOSTER VACCINE AGE 60> 3/9/2018* INFLUENZA AGE 9 TO ADULT 8/1/2017 HEMOGLOBIN A1C Q6M 8/16/2017 MICROALBUMIN Q1 2/16/2018 LIPID PANEL Q1 2/16/2018 MEDICARE YEARLY EXAM 3/10/2018 GLAUCOMA SCREENING Q2Y 5/4/2018 *Topic was postponed. The date shown is not the original due date. Allergies as of 5/4/2017  Review Complete On: 5/4/2017 By: Sharmin Paulino MD  
  
 Severity Noted Reaction Type Reactions Augmentin [Amoxicillin-pot Clavulanate]  04/25/2013   Side Effect Swelling Lisinopril  04/25/2013   Systemic Angioedema Reglan [Metoclopramide]  11/28/2015    Other (comments) Made her stomach hurt worse Current Immunizations  Reviewed on 11/7/2013 Name Date Influenza High Dose Vaccine PF 12/30/2015  1:09 PM  
 Influenza Vaccine 12/3/2014 Influenza Vaccine PF 11/7/2013 11:31 AM  
 Pneumococcal Conjugate (PCV-13) 8/31/2015 Pneumococcal Polysaccharide (PPSV-23) 3/9/2017  3:00 PM  
 Tetanus Toxoid, Adsorbed 10/12/2012 12:00 AM  
  
 Not reviewed this visit You Were Diagnosed With   
  
 Codes Comments Routine general medical examination at a health care facility    -  Primary ICD-10-CM: Z00.00 ICD-9-CM: V70.0 Screening for alcoholism     ICD-10-CM: Z13.89 ICD-9-CM: V79.1 Controlled type 2 diabetes mellitus without complication, without long-term current use of insulin ([facility]     ICD-10-CM: E11.9 ICD-9-CM: 250.00 Essential hypertension     ICD-10-CM: I10 
ICD-9-CM: 401.9 ASCVD (arteriosclerotic cardiovascular disease)     ICD-10-CM: I25.10 ICD-9-CM: 429.2, 440.9 Memory loss     ICD-10-CM: R41.3 ICD-9-CM: 780.93 Medication refill     ICD-10-CM: Z76.0 ICD-9-CM: V68.1 Vitals BP Pulse Temp Resp Height(growth percentile) Weight(growth percentile) 150/80 90 96.7 °F (35.9 °C) (Oral) 18 5' 7\" (1.702 m) 146 lb (66.2 kg) SpO2 BMI OB Status Smoking Status 96% 22.87 kg/m2 Postmenopausal Never Smoker Vitals History BMI and BSA Data Body Mass Index Body Surface Area  
 22.87 kg/m 2 1.77 m 2 Preferred Pharmacy Pharmacy Name Phone [facility], [address] [place] [address] Drive [phone] Your Updated Medication List  
  
   
This list is accurate as of: 5/4/17 12:56 PM.  Always use your most recent med list. amLODIPine 10 mg tablet Commonly known as:  Charley Rafter Take 1 Tab by mouth daily. Indications: hypertension  
  
 aspirin 81 mg chewable tablet Take 162 mg by mouth daily. clopidogrel 75 mg Tab Commonly known as:  PLAVIX Take 75 mg by mouth daily. ergocalciferol 50,000 unit capsule Commonly known as:  VITAMIN D2 Take 1 Cap by mouth every seven (7) days. glipiZIDE 10 mg tablet Commonly known as:  Dinorah Kaycee Take 1 Tab by mouth two (2) times a day. Indications: type 2 diabetes mellitus  
  
 glucose blood VI test strips strip Commonly known as:  ACCU-CHEK RADHA PLUS TEST STRP Use to test blood sugar daily  
  
 isosorbide mononitrate ER 60 mg CR tablet Commonly known as:  IMDUR Take 1 Tab by mouth every morning. Indications: ANGINA PECTORIS PREVENTION (DO NOT USE) Lancets Misc Commonly known as:  ACCU-CHEK SOFTCLIX LANCETS Use to check blood sugar at least once daily  
  
 metFORMIN 500 mg tablet Commonly known as:  GLUCOPHAGE Take 2 tablets in the morning with breakfast and one in the evening with supper  Indications: type 2 diabetes mellitus  
  
 omeprazole 40 mg capsule Commonly known as:  PRILOSEC Take 1 Cap by mouth two (2) times a day. Indications: GASTRIC ULCER  
  
 propylthiouracil 50 mg tablet Commonly known as:  PTU Take 1 Tab by mouth three (3) times daily. rivastigmine 9.5 mg/24 hr patch Commonly known as:  EXELON  
1 Patch by TransDERmal route daily. rosuvastatin 20 mg tablet Commonly known as:  CRESTOR Take 1 Tab by mouth nightly. Indications: hypercholesterolemia  
  
 valsartan-hydroCHLOROthiazide 320-25 mg per tablet Commonly known as:  DIOVAN HCT Take 1 Tab by mouth daily. Prescriptions Sent to Pharmacy Refills  
 glipiZIDE (GLUCOTROL) 10 mg tablet 5 Sig: Take 1 Tab by mouth two (2) times a day. Indications: type 2 diabetes mellitus Class: Normal  
 Pharmacy: [id number] [place] [address] Ph #: [phone] Route: Oral  
  
We Performed the Following AMB POC GLUCOSE BLOOD, BY GLUCOSE MONITORING DEVICE [87170 CPT(R)]  DIABETES FOOT EXAM [HM Custom] Follow-up Instructions Return in about 1 month (around 6/4/2017) for HTN, DM, cholesterol. Patient Instructions Schedule of Personalized Health Plan (Provide Copy to Patient) The best way to stay healthy is to live a healthy lifestyle. A healthy lifestyle includes regular exercise, eating a well-balanced diet, keeping a healthy weight and not smoking. Regular physical exams and screening tests are another important way to take care of yourself.  Preventive exams provided by health care providers can find health problems early when treatment works best and can keep you from getting certain diseases or illnesses. Preventive services include exams, lab tests, screenings, shots, monitoring and information to help you take care of your own health. All people over 65 should have a pneumonia shot. Pneumonia shots are usually only needed once in a lifetime unless your doctor decides differently. Here is a list of your current Health Maintenance items with a due date: 
Health Maintenance Topic Date Due  
 FOOT EXAM Q1  11/07/2014  
 DTaP/Tdap/Td series (1 - Tdap) 06/16/2017 (Originally 10/14/1952)  EYE EXAM RETINAL OR DILATED Q1  08/02/2017 (Originally 5/4/2017)  ZOSTER VACCINE AGE 60>  03/09/2018 (Originally 10/14/1991)  INFLUENZA AGE 9 TO ADULT  08/01/2017  
 HEMOGLOBIN A1C Q6M  08/16/2017  MICROALBUMIN Q1  02/16/2018  LIPID PANEL Q1  02/16/2018  MEDICARE YEARLY EXAM  03/10/2018  GLAUCOMA SCREENING Q2Y  05/04/2018  
 OSTEOPOROSIS SCREENING (DEXA)  Addressed  Pneumococcal 65+ Low/Medium Risk  Completed Introducing [facility]! [place] [employer] introduces Pand[employer] patient portal. Now you can access parts of your medical record, email your doctor's office, and request medication refills online. 1. In your internet browser, go to [email or web address] 2. Click on the First Time User? Click Here link in the Sign In box. You will see the New Member Sign Up page. 3. Enter your Pandorama Access Code exactly as it appears below. You will not need to use this code after youve completed the sign-up process. If you do not sign up before the expiration date, you must request a new code. · Pand[employer] Access Code: [email or web address] Expires: 5/17/2017  2:50 PM 
 
4. Enter the last four digits of your Social Security Number (xxxx) and Date of Birth (mm/dd/yyyy) as indicated and click Submit. You will be taken to the next sign-up page. 5. Create a Pandorama ID.  This will be your Pandorama login ID and cannot be changed, so think of one that is secure and easy to remember. 6. Create a [employer] password. You can change your password at any time. 7. Enter your Password Reset Question and Answer. This can be used at a later time if you forget your password. 8. Enter your e-mail address. You will receive e-mail notification when new information is available in [address] 9. Click Sign Up. You can now view and download portions of your medical record. 10. Click the Download Summary menu link to download a portable copy of your medical information. If you have questions, please visit the Frequently Asked Questions section of the [employer] website. Remember, [employer] is NOT to be used for urgent needs. For medical emergencies, dial 911. Now available from your iPhone and Android! Please provide this summary of care documentation to your next provider. Your primary care clinician is listed as [facility]. If you have any questions after today's visit, please call [phone].

## 2017-05-11 RX ORDER — PROPYLTHIOURACIL 50 MG/1
50 TABLET ORAL 3 TIMES DAILY
Qty: 90 TAB | Refills: 5 | Status: SHIPPED | OUTPATIENT
Start: 2017-05-11 | End: 2017-05-18 | Stop reason: SDUPTHER

## 2017-05-11 NOTE — TELEPHONE ENCOUNTER
Requested Prescriptions     Pending Prescriptions Disp Refills    propylthiouracil (PTU) 50 mg tablet 90 Tab 5     Sig: Take 1 Tab by mouth three (3) times daily.

## 2017-05-18 RX ORDER — PROPYLTHIOURACIL 50 MG/1
50 TABLET ORAL 3 TIMES DAILY
Qty: 90 TAB | Refills: 5 | Status: SHIPPED | OUTPATIENT
Start: 2017-05-18

## 2017-06-28 DIAGNOSIS — Z76.0 MEDICATION REFILL: ICD-10-CM

## 2017-06-28 RX ORDER — OMEPRAZOLE 40 MG/1
40 CAPSULE, DELAYED RELEASE ORAL 2 TIMES DAILY
Qty: 60 CAP | Refills: 5 | Status: SHIPPED | OUTPATIENT
Start: 2017-06-28

## 2017-06-28 NOTE — TELEPHONE ENCOUNTER
Requested Prescriptions     Pending Prescriptions Disp Refills    omeprazole (PRILOSEC) 40 mg capsule 60 Cap 5     Sig: Take 1 Cap by mouth two (2) times a day.  Indications: Gastric Ulcer

## 2020-07-28 NOTE — PROGRESS NOTES
Advised Pt had . Pt has no sxs. Pt supposedly had the glucatrol stopped last May, but we restarted in it December. Yet she does not have any in the house  Given she has no sxs, will restart glucatrol (at lower dose--I believe it was stopped due to concerns re hypoglycemia, but pt can not afford newer safer agents).  Appt ASAP to f/u here Satisfactory